# Patient Record
Sex: FEMALE | Race: BLACK OR AFRICAN AMERICAN | NOT HISPANIC OR LATINO | Employment: OTHER | URBAN - METROPOLITAN AREA
[De-identification: names, ages, dates, MRNs, and addresses within clinical notes are randomized per-mention and may not be internally consistent; named-entity substitution may affect disease eponyms.]

---

## 2017-01-05 ENCOUNTER — ALLSCRIPTS OFFICE VISIT (OUTPATIENT)
Dept: OTHER | Facility: OTHER | Age: 67
End: 2017-01-05

## 2017-01-05 ENCOUNTER — LAB REQUISITION (OUTPATIENT)
Dept: LAB | Facility: HOSPITAL | Age: 67
End: 2017-01-05
Payer: COMMERCIAL

## 2017-01-05 DIAGNOSIS — R39.9 UNSPECIFIED SYMPTOMS AND SIGNS INVOLVING THE GENITOURINARY SYSTEM: ICD-10-CM

## 2017-01-05 LAB
BILIRUB UR QL STRIP: NORMAL
CLARITY UR: NORMAL
COLOR UR: YELLOW
FLUAV AG SPEC QL IA: NEGATIVE
GLUCOSE (HISTORICAL): NORMAL
HGB UR QL STRIP.AUTO: NORMAL
INFLUENZA B AG (HISTORICAL): NEGATIVE
KETONES UR STRIP-MCNC: NORMAL MG/DL
LEUKOCYTE ESTERASE UR QL STRIP: NORMAL
NITRITE UR QL STRIP: NORMAL
PH UR STRIP.AUTO: 5 [PH]
PROT UR STRIP-MCNC: NORMAL MG/DL
SP GR UR STRIP.AUTO: 1
UROBILINOGEN UR QL STRIP.AUTO: 0.2

## 2017-01-05 PROCEDURE — 87086 URINE CULTURE/COLONY COUNT: CPT | Performed by: PHYSICIAN ASSISTANT

## 2017-01-07 LAB — BACTERIA UR CULT: NORMAL

## 2017-01-09 ENCOUNTER — GENERIC CONVERSION - ENCOUNTER (OUTPATIENT)
Dept: OTHER | Facility: OTHER | Age: 67
End: 2017-01-09

## 2017-02-07 ENCOUNTER — ALLSCRIPTS OFFICE VISIT (OUTPATIENT)
Dept: OTHER | Facility: OTHER | Age: 67
End: 2017-02-07

## 2017-02-07 DIAGNOSIS — Z12.39 ENCOUNTER FOR OTHER SCREENING FOR MALIGNANT NEOPLASM OF BREAST: ICD-10-CM

## 2017-02-07 LAB — HBA1C MFR BLD HPLC: 8.6 %

## 2017-02-16 ENCOUNTER — GENERIC CONVERSION - ENCOUNTER (OUTPATIENT)
Dept: OTHER | Facility: OTHER | Age: 67
End: 2017-02-16

## 2017-03-17 ENCOUNTER — GENERIC CONVERSION - ENCOUNTER (OUTPATIENT)
Dept: OTHER | Facility: OTHER | Age: 67
End: 2017-03-17

## 2017-04-18 ENCOUNTER — GENERIC CONVERSION - ENCOUNTER (OUTPATIENT)
Dept: OTHER | Facility: OTHER | Age: 67
End: 2017-04-18

## 2018-01-10 NOTE — MISCELLANEOUS
Message  I called and spoke with David Mccarthy to schedule a f/u appt  She stated that she is now following with [facility] due to insurance changes within the past year  No follow up scheduled at this time  Active Problems    1  Abnormal chest CT (793 2) (R93 8)   2  Accelerated hypertension (401 0) (I10)   3  Adrenal adenoma, left (227 0) (D35 02)   4  Asthma (493 90) (J45 909)   5  Benign essential hypertension (401 1) (I10)   6  Carotid stenosis (433 10) (I65 29)   7  Cerebrovascular disease (437 9) (I67 9)   8  Chronic sinusitis (473 9) (J32 9)   9  CKD (chronic kidney disease), stage III (585 3) (N18 3)   10  Diabetic neuropathy (250 60,357 2) (E11 40)   11  Gastroesophageal reflux disease (530 81) (K21 9)   12  Hair loss (704 00) (L65 9)   13  Hyperlipidemia (272 4) (E78 5)   14  Inadequately controlled diabetes mellitus (250 00) (E11 65)   15  Left ventricular hypertrophy (429 3) (I51 7)   16  Medicare annual wellness visit, initial (V70 0) (Z00 00)   17  Medicare annual wellness visit, subsequent (V70 0) (Z00 00)   18  Need for pneumococcal vaccine (V03 82) (Z23)   19  Need for revaccination (V05 9) (Z23)   20  Obstructive sleep apnea (327 23) (G47 33)   21  Primary localized osteoarthritis of hip (715 15) (M16 10)   22  Proteinuria (791 0) (R80 9)   23  Right knee DJD (715 96) (M17 11)   24  Screening breast examination (V76 10) (Z12 39)   25  Trigger ring finger of right hand (727 03) (M65 341)    Current Meds   1  Advair Diskus 250-50 MCG/DOSE Inhalation Aerosol Powder Breath Activated; Inhale 1   puff twice daily; Therapy: 89BFZ1357 to (Evaluate:85Tdw4150)  Requested for: 53Tax4615; Last   Rx:44Bsf9629 Ordered   2  Albuterol Sulfate (2 5 MG/3ML) 0 083% Inhalation Nebulization Solution; USE 1 UNIT   DOSE EVERY 4-6 HOURS AS NEEDED FOR WHEEZING ; Therapy: 94XIC5053 to (Evaluate:88Szy7204)  Requested for: 70OUL9003; Last   Rx:72Nib3986 Ordered   3   AmLODIPine Besylate 10 MG Oral Tablet; TAKE 1 TABLET DAILY AS DIRECTED; Therapy: 05LJN7731 to (Evaluate:10Aug2015); Last Rx:11Feb2015 Ordered   4  Atorvastatin Calcium 40 MG Oral Tablet; TAKE 1 TABLET DAILY AS DIRECTED; Therapy: 27XIC9641 to (Evaluate:17Nov2017)  Requested for: 22Nov2016; Last   Rx:22Nov2016 Ordered   5  Clopidogrel Bisulfate 75 MG Oral Tablet (Plavix); TAKE 1 TABLET DAILY; Therapy: 53WMA4564 to (Evaluate:08Jan2017)  Requested for: 12Jul2016; Last   Rx:12Jul2016 Ordered   6  Doxazosin Mesylate 4 MG Oral Tablet; TAKE 1/2 TABLET DAILY in PM x 1 week then 1 at   night; Therapy: 35HQI4285 to (Evaluate:06Aug2017)  Requested for: 80WOM0047; Last   Rx:07Feb2017 Ordered   7  Fluticasone Propionate 50 MCG/ACT Nasal Suspension; USE 2 SPRAYS IN EACH   NOSTRIL ONCE DAILY; Therapy: 45YWH8201 to (Oneda [place])  Requested for: 36MDP6822; Last   Rx:03Feb2017 Ordered   8  HydroCHLOROthiazide 50 MG Oral Tablet; TAKE 1 TABLET BY MOUTH EVERY OTHER   DAY; Therapy: 89KDE6775 to (Evaluate:08Jan2017)  Requested for: 23Khz4531; Last   Rx:12Jul2016 Ordered   9  Januvia 100 MG Oral Tablet; TAKE 1 TABLET ONCE DAILY; Therapy: 09CTN3047 to (Evaluate:15Jul2017)  Requested for: 37FHK1177; Last   Rx:16Jan2017 Ordered   10  Lantus SoloStar 100 UNIT/ML Subcutaneous Solution Pen-injector; INJECT 52 UNIT    Daily; Therapy: 81VNM7358 to (Evaluate:01Jun2017)  Requested for: 17CDP7345; Last    Rx:06Jun2016 Ordered   11  Losartan Potassium 100 MG Oral Tablet; TAKE 1 TABLET DAILY; Therapy: 96HOH6361 to (Evaluate:17Nov2017)  Requested for: 22Nov2016; Last    Rx:22Nov2016 Ordered   12  Magnesium Oxide 400 MG Oral Tablet; TAKE 1 TABLET DAILY; Therapy: 62YGU2501 to (Evaluate:10Aug2015); Last Rx:60Rul5324 Ordered   13  MetFORMIN HCl - 1000 MG Oral Tablet (Glucophage); TAKE 1 TABLET TWICE DAILY AS    DIRECTED; Therapy: 17JIN6806 to (Evaluate:01Jun2017)  Requested for: 76CKU0955; Last    Rx:06Jun2016 Ordered   14   Metoprolol Succinate  MG Oral Tablet Extended Release 24 Hour (Toprol XL);    TAKE 1 5 TABLET Daily; Therapy: 83PZH1216 to (Evaluate:01Jun2017)  Requested for: 36HAU0363; Last    Rx:06Jun2016 Ordered   15  Pantoprazole Sodium 20 MG Oral Tablet Delayed Release; TAKE 1 TABLET DAILY; Therapy: 59GQO0348 to (Keshav Dave)  Requested for: 51OPI2587; Last    Rx:22Twc1267 Ordered   16  ProAir  (90 Base) MCG/ACT Inhalation Aerosol Solution; INHALE 1 TO 2 PUFFS    EVERY 4 TO 6 HOURS AS NEEDED; Therapy: 03ZXZ4357 to (Evaluate:07Jan2017)  Requested for: 53ODN3566; Last    Rx:05Jan2017 Ordered   17  TraMADol HCl - 50 MG Oral Tablet; TAKE 1 TABLET ONCE OR TWICE DAILY AS    NEEDED FOR PAIN -try to avoid; Therapy: 98LKI0857 to (Evaluate:83Ypu9316); Last Rx:05Jan2017 Ordered    Allergies    1  Lisinopril TABS   2  Sulfa Drugs   3  Victoza SOPN   4   Zithromax TABS    Signatures   Electronically signed by : Isaac Garcia, ; Apr 18 2017 11:51AM EST                       (Author)

## 2018-01-10 NOTE — MISCELLANEOUS
Message   Recorded as Task   Date: 06/07/2016 10:32 AM, Created By: Tiara Tafoya   Task Name: Follow Up   Assigned To: Lila Burroughs   Regarding Patient: Vianey Mercer, Status: In Progress   Comment:    Abraham Osorio - 07 Jun 2016 10:32 AM     TASK CREATED  Still waiting on renin and aldosterone, metanphrines on her  But her electrolytes an renal function are stable  Please relay that to her  Thank you  Helena Duron - 07 Jun 2016 10:52 AM     TASK IN PROGRESS   Helena Duron - 07 Jun 2016 10:52 AM     TASK EDITED  l/m for patient to return call       Patient is aware of above  AG      Active Problems    1  Abnormal chest CT (793 2) (R93 8)   2  Accelerated essential hypertension (401 0) (I10)   3  Accelerated hypertension (401 0) (I10)   4  Asthma (493 90) (J45 909)   5  Benign essential hypertension (401 1) (I10)   6  Carotid stenosis (433 10) (I65 29)   7  Cerebrovascular disease (437 9) (I67 9)   8  Chronic cough (786 2) (R05)   9  Chronic rhinitis (472 0) (J31 0)   10  CKD (chronic kidney disease), stage III (585 3) (N18 3)   11  Cough (786 2) (R05)   12  Gastroesophageal reflux disease (530 81) (K21 9)   13  Hip arthritis (716 95) (M19 90)   14  Hyperlipidemia (272 4) (E78 5)   15  Inadequately controlled diabetes mellitus (250 00) (E11 65)   16  Left ventricular hypertrophy (429 3) (I51 7)   17  Lumbar strain (847 2) (S39 012A)   18  Need for pneumococcal vaccine (V03 82) (Z23)   19  Obstructive sleep apnea (327 23) (G47 33)   20  Primary localized osteoarthritis of hip (715 15) (M16 10)   21  Proteinuria (791 0) (R80 9)   22  Right knee DJD (715 96) (M17 9)   23  Sebaceous cyst (706 2) (L72 3)   24  Shortness of breath (786 05) (R06 02)   25  Trigger ring finger of right hand (727 03) (M65 341)    Current Meds   1  Advair Diskus 250-50 MCG/DOSE Inhalation Aerosol Powder Breath Activated; Inhale 1   puff twice daily; Therapy: 83NTG4640 to (Evaluate:95Tkq2003)  Requested for: 44RWG9247;  Last Rx:74Ckb1861 Ordered   2  Albuterol Sulfate (2 5 MG/3ML) 0 083% Inhalation Nebulization Solution; USE 1 UNIT   DOSE EVERY 4-6 HOURS AS NEEDED FOR WHEEZING ; Therapy: 29WOD0750 to (Evaluate:30Jan2016)  Requested for: 66FKM2182; Last   Rx:54Ihm9913 Ordered   3  AmLODIPine Besylate 10 MG Oral Tablet; TAKE 1 TABLET DAILY AS DIRECTED; Therapy: 84ZBW0434 to (Evaluate:10Aug2015); Last Rx:89Bvm4130 Ordered   4  Atorvastatin Calcium 40 MG Oral Tablet; take one tablet by mouth every day; Therapy: 00OTG1303 to ()  Requested for: 75GWI1721; Last   Rx:30Nov2015 Ordered   5  Clopidogrel Bisulfate 75 MG Oral Tablet; TAKE 1 TABLET DAILY; Therapy: 05JOS0728 to (Evaluate:03Dec2016)  Requested for: 28NNE9450; Last   Rx:06Jun2016 Ordered   6  Doxazosin Mesylate 1 MG Oral Tablet; TAKE 1 TABLET AT BEDTIME; Therapy: 96UJI7881 to (Evaluate:02Yra8053)  Requested for: 02Jun2016; Last   Rx:02Jun2016 Ordered   7  Fluticasone Propionate 50 MCG/ACT Nasal Suspension; USE 2 SPRAYS IN EACH   NOSTRIL ONCE DAILY; Therapy: 90CSP3136 to (Evaluate:28Jun2016)  Requested for: 27TCM7577; Last   Rx:31Phj9024 Ordered   8  Hydrochlorothiazide 50 MG Oral Tablet; TAKE 1 TABLET BY MOUTH EVERY OTHER   DAY; Therapy: 07CKD6489 to (Evaluate:30Mar2016)  Requested for: 02LOX7923; Last   Rx:02Oct2015 Ordered   9  Januvia 100 MG Oral Tablet; TAKE 1 TABLET ONCE DAILY; Therapy: 27JJY1091 to (Evaluate:27Nee4780)  Requested for: 65NBM0481; Last   Rx:02Mar2016 Ordered   10  Lantus SoloStar 100 UNIT/ML Subcutaneous Solution Pen-injector; INJECT 52 UNIT    Daily; Therapy: 48VWV1319 to (Evaluate:01Jun2017)  Requested for: 80PQG7112; Last    Rx:06Jun2016 Ordered   11  Losartan Potassium 100 MG Oral Tablet; TAKE 1 TABLET DAILY; Therapy: 67MCK4194 to (Evaluate:10Zxv1149)  Requested for: 60CWB2004; Last    Rx:22Piz0970 Ordered   12  Magnesium Oxide 400 MG Oral Tablet; TAKE 1 TABLET DAILY; Therapy: 50MQU4507 to (Evaluate:10Aug2015);  Last Rx:29Qra2567 Ordered   13  MetFORMIN HCl - 1000 MG Oral Tablet; TAKE 1 TABLET TWICE DAILY AS DIRECTED; Therapy: 17JZM2354 to (Evaluate:01Jun2017)  Requested for: 15EMB2154; Last    Rx:06Jun2016 Ordered   14  Metoprolol Succinate  MG Oral Tablet Extended Release 24 Hour; TAKE 1 5    TABLET Daily; Therapy: 55QUE4277 to (Evaluate:01Jun2017)  Requested for: 21RRZ2059; Last    Rx:06Jun2016 Ordered   15  Pantoprazole Sodium 20 MG Oral Tablet Delayed Release; TAKE 1 TABLET DAILY; Therapy: 22BAJ6946 to (Jenniffer Goodpasture)  Requested for: 09WPF4863; Last    Rx:63Rwr6126 Ordered   16  ProAir  (90 Base) MCG/ACT Inhalation Aerosol Solution; INHALE 1 TO 2 PUFFS    EVERY 4 TO 6 HOURS AS NEEDED; Therapy: 12SIP6402 to (Evaluate:14Jun2015)  Requested for: 12Jun2015; Last    Rx:12Jun2015 Ordered   17  TraMADol HCl - 50 MG Oral Tablet; TAKE 1 TABLET ONCE OR TWICE DAILY AS    NEEDED FOR PAIN -try to avoid; Therapy: 46RJO9721 to (Evaluate:23Jun2016); Last CO:26TFU7479 Ordered    Allergies    1  Lisinopril TABS   2  Sulfa Drugs   3  Victoza SOPN   4   Zithromax TABS    Signatures   Electronically signed by : Katlin Loo DO; Jun 7 2016  1:15PM EST                       (Author)

## 2018-01-12 VITALS
HEART RATE: 80 BPM | DIASTOLIC BLOOD PRESSURE: 104 MMHG | SYSTOLIC BLOOD PRESSURE: 174 MMHG | HEIGHT: 66 IN | BODY MASS INDEX: 38.93 KG/M2 | WEIGHT: 242.25 LBS

## 2018-01-13 VITALS
SYSTOLIC BLOOD PRESSURE: 182 MMHG | TEMPERATURE: 99.1 F | DIASTOLIC BLOOD PRESSURE: 110 MMHG | HEART RATE: 88 BPM | HEIGHT: 66 IN | BODY MASS INDEX: 38.81 KG/M2 | WEIGHT: 241.5 LBS

## 2018-01-13 NOTE — RESULT NOTES
Verified Results  (1) COMPREHENSIVE METABOLIC PANEL 64VET2180 27:78JH Javier Louis Order Number: [id number]      National Kidney Disease Education Program recommendations are as follows:  GFR calculation is accurate only with a steady state creatinine  Chronic Kidney disease less than 60 ml/min/1 73 sq  meters  Kidney failure less than 15 ml/min/1 73 sq  meters  Test Name Result Flag Reference   GLUCOSE,RANDM 143 mg/dL H    If the patient is fasting, the ADA then defines impaired fasting glucose as > 100 mg/dL and diabetes as > or equal to 123 mg/dL     SODIUM 139 mmol/L  136-145   POTASSIUM 3 7 mmol/L  3 5-5 3   CHLORIDE 103 mmol/L  100-108   CARBON DIOXIDE 30 mmol/L  21-32   ANION GAP (CALC) 6 mmol/L  4-13   BLOOD UREA NITROGEN 18 mg/dL  5-25   CREATININE 1 29 mg/dL  0 60-1 30   Standardized to IDMS reference method   CALCIUM 9 0 mg/dL  8 3-10 1   BILI, TOTAL 0 39 mg/dL  0 20-1 00   ALK PHOSPHATAS 71 U/L     ALT (SGPT) 13 U/L  12-78   AST(SGOT) 15 U/L  5-45   ALBUMIN 3 3 g/dL L 3 5-5 0   TOTAL PROTEIN 7 3 g/dL  6 4-8 2   eGFR Non-African American 50 4 ml/min/1 73sq m       (1) URINALYSIS WITH MICROSCOPIC 02Mar2016 09:34AM Javier Louis Order Number: [id number]     Test Name Result Flag Reference   COLOR Yellow     CLARITY Clear     PH UA 5 5  4 5-8 0   LEUKOCYTE ESTERASE UA Trace A Negative   NITRITE UA Negative  Negative   PROTEIN UA 30 (1+) mg/dl A Negative   GLUCOSE UA Negative mg/dl  Negative   KETONES UA Negative mg/dl  Negative   UROBILINOGEN UA 0 2 E U /dl  0 2, 1 0 E U /dl   BILIRUBIN UA Negative  Negative   BLOOD UA Trace A Negative, Trace-Intact   SPECIFIC GRAVITY UA 1 025  1 003-1 030   WBC UA 2-4 /hpf A None Seen   RBC UA None Seen /hpf  None Seen   HYALINE CASTS 0-1 /lpf A (none)   BACTERIA None Seen /hpf  None Seen, Occasional   AMORPHOUS URATES Occasional /hpf     EPITHELIAL CELLS Occasional /hpf  None Seen, Occasional     (1) LIPID PANEL, FASTING 22UQN8667 09:34AM Myrna Mathews Order Number: [id number]      Triglyceride:         Normal              <150 mg/dl       Borderline High    150-199 mg/dl       High               200-499 mg/dl       Very High          >499 mg/dl  Cholesterol:         Desirable        <200 mg/dl      Borderline High  200-239 mg/dl      High             >239 mg/dl  HDL Cholesterol:        High    >59 mg/dL      Low     <41 mg/dL  LDL CALCULATED:    This screening LDL is a calculated result  It does not have the accuracy of the Direct Measured LDL in the monitoring of patients with hyperlipidemia and/or statin therapy  Direct Measure LDL (MLG707) must be ordered separately in these patients       Test Name Result Flag Reference   CHOLESTEROL 228 mg/dL H    HDL,DIRECT 57 mg/dL  40-60   LDL CHOLESTEROL CALCULATED 128 mg/dL H 0-100   TRIGLYCERIDES 215 mg/dL H <=150

## 2018-01-13 NOTE — RESULT NOTES
Verified Results  (1) URINE CULTURE 03XVI8165 04:31PM Lopez Ruth    Order Number: [id number]     Test Name Result Flag Reference   CLINICAL REPORT (Report)     Test:        Urine culture  Specimen Type:   Urine  Specimen Date:   1/5/2017 4:31 PM  Result Date:    1/7/2017 12:51 PM  Result Status:   Final result  Resulting Lab:   [place] [address]            Tel: [phone]      CULTURE                                       ------------------                                   30,000-39,000 cfu/ml Mixed Contaminants X3

## 2018-01-15 NOTE — RESULT NOTES
Verified Results  * CT CHEST WO CONTRAST 43PEX0579 09:31AM [facility]     Test Name Result Flag Reference   CT CHEST WO CONTRAST (Report)     CT CHEST WITHOUT IV CONTRAST     INDICATION: Follow up lymphadenopathy, right-sided chest pain     COMPARISON: 12/4/2015     TECHNIQUE: CT examination of the chest was performed without intravenous contrast  Axial, sagittal and coronal reformatted images were submitted for interpretation  Coronal thick section MIP (maximal intensity projection) images were also created  This examination, like all CT scans performed in the [facility], was performed utilizing techniques to minimize radiation dose exposure, including the use of iterative reconstruction and automated exposure control  FINDINGS:     LUNGS: There is a 2 mm right middle lobe nodule on series 602, image 27  This is stable from the recent prior study  There is no tracheal or endobronchial lesion  PLEURA: Unremarkable  HEART/GREAT VESSELS: Unremarkable for patient's age  MEDIASTINUM AND LUL: There is a precarinal lymph node measuring 1 1 x 1 6 cm, previously 1 4 x 1 5 cm  Subcentimeter para-aortic lymph nodes measure 4 and 8 mm in shortest axis, previously 6 and 9 mm  There is no hilar lymphadenopathy though    evaluation is limited without contrast      CHEST WALL AND LOWER NECK: There is a subcentimeter right thyroid nodule  VISUALIZED STRUCTURES IN THE UPPER ABDOMEN: There is a too small to characterize hypodense lesion in the lateral segment of the left hepatic lobe, stable and likely a small cyst  There is a 1 1 x 1 4 cm left adrenal adenoma  OSSEOUS STRUCTURES: No acute fracture  No destructive osseous lesion  IMPRESSION:     1  Slight interval decrease in size of previously seen lymph nodes, the largest in the precarinal region  No gross hilar adenopathy though limited evaluation due to lack of contrast      2  Subcentimeter right thyroid nodule   ccording to guidelines published in the February 2015 white paper on this topic in the Journal of the [employer] of Radiology [place]), because the nodule(s) are less than 1 5 cm in size and without suspicious   feature, no further evaluation is recommended  3  Stable 2 mm right middle lobe lung nodule  If there are risk factors for lung cancer, an additional follow-up in 9 months is recommended  4  Left adrenal adenoma         Workstation performed: [id number]     Signed by:   Leatha Batista MD   3/2/16

## 2018-03-07 NOTE — PROGRESS NOTES
History of Present Illness    Revaccination   Vaccine Information: Vaccine(s) Given (names): pneumovax 2016  Spoke with patient regarding risks and benefits of revaccination  Action(s): Pt will be revaccinated  Appointment scheduled: [phone] pp  Pt called (attempt 1): [phone] pp   left message with spouse  No Show Appt(s): [id number]  Revaccination Completed: [id number]  Active Problems     1  Carotid stenosis (433 10) (I65 29)   2  Diabetic neuropathy (250 60,357 2) (E11 40)   3  Gastroesophageal reflux disease (530 81) (K21 9)   4  Hair loss (704 00) (L65 9)   5  Hyperlipidemia (272 4) (E78 5)   6  Left ventricular hypertrophy (429 3) (I51 7)   7  Need for pneumococcal vaccine (V03 82) (Z23)   8  Proteinuria (791 0) (R80 9)   9  Trigger ring finger of right hand (727 03) (M65 341)    Cerebrovascular disease (437 9) (I67 9)     - hx "ministrokes"       Asthma (493 90) (J45 909)       Hip arthritis (716 95) (M19 90)       Primary localized osteoarthritis of hip (715 15) (M16 10)       Inadequately controlled diabetes mellitus (250 00) (E11 65)     - onset        Sebaceous cyst (706 2) (L72 3)       Hypertension, essential (401 9) (I10)       Shortness of breath (786 05) (R06 02)       Abnormal chest CT (793 2) (R93 8)       Lumbar strain (847 2) (S39 012A)       Cough (786 2) (R05)       Chronic rhinitis (472 0) (J31 0)       Chronic cough (786 2) (R05)       CKD (chronic kidney disease), stage III (585 3) (N18 3)       Accelerated hypertension (401 0) (I10)       Chronic sinusitis (473 9) (J32 9)          Immunizations  Influenza --- Nikolay Boyle: [id number] Ashin-Oct-2016   PPSV --- Series1: 2016     Current Meds   1  Advair Diskus 250-50 MCG/DOSE Inhalation Aerosol Powder Breath Activated; Inhale 1   puff twice daily   2  Albuterol Sulfate (2 5 MG/3ML) 0 083% Inhalation Nebulization Solution; USE 1 UNIT   DOSE EVERY 4-6 HOURS AS NEEDED FOR WHEEZING    3   AmLODIPine Besylate 10 MG Oral Tablet; TAKE 1 TABLET DAILY AS DIRECTED   4  Atorvastatin Calcium 40 MG Oral Tablet; TAKE 1 TABLET DAILY AS DIRECTED   5  Clopidogrel Bisulfate 75 MG Oral Tablet; TAKE 1 TABLET DAILY   6  Doxazosin Mesylate 1 MG Oral Tablet; TAKE 1 TABLET AT BEDTIME   7  Fluticasone Propionate 50 MCG/ACT Nasal Suspension; USE 2 SPRAYS IN EACH   NOSTRIL ONCE DAILY   8  HydroCHLOROthiazide 50 MG Oral Tablet; TAKE 1 TABLET BY MOUTH EVERY OTHER   DAY   9  Januvia 100 MG Oral Tablet; TAKE 1 TABLET ONCE DAILY   10  Lantus SoloStar 100 UNIT/ML Subcutaneous Solution Pen-injector; INJECT 52 UNIT    Daily   11  Losartan Potassium 100 MG Oral Tablet; TAKE 1 TABLET DAILY   12  Magnesium Oxide 400 MG Oral Tablet; TAKE 1 TABLET DAILY   13  MetFORMIN HCl - 1000 MG Oral Tablet; TAKE 1 TABLET TWICE DAILY AS DIRECTED   14  Metoprolol Succinate  MG Oral Tablet Extended Release 24 Hour; TAKE 1 5    TABLET Daily   15  Pantoprazole Sodium 20 MG Oral Tablet Delayed Release; TAKE 1 TABLET DAILY   16  ProAir  (90 Base) MCG/ACT Inhalation Aerosol Solution; INHALE 1 TO 2 PUFFS    EVERY 4 TO 6 HOURS AS NEEDED   17  TraMADol HCl - 50 MG Oral Tablet; TAKE 1 TABLET ONCE OR TWICE DAILY AS NEEDED    FOR PAIN -try to avoid    Allergies    1  Lisinopril TABS   2  Sulfa Drugs   3  Victoza SOPN   4  Zithromax TABS    Plan    1   RVAC-Pneumovax 23 25 MCG/0 5ML Injection Injectable    Signatures   Electronically signed by : Sayda Ardon MD; Feb 7 2017  9:02PM EST                       (Author)

## 2022-01-17 ENCOUNTER — TELEPHONE (OUTPATIENT)
Dept: ADMINISTRATIVE | Facility: OTHER | Age: 72
End: 2022-01-17

## 2022-01-17 NOTE — TELEPHONE ENCOUNTER
----- Message from Carlee Keane sent at 1/17/2022  8:26 AM EST -----  Regarding: Mammogram  01/17/22 8:26 AM    Hello, our patient Sharifer Mason has had Mammogram completed/performed  Please assist in updating the patient chart by pulling the Care Everywhere (CE) document  The date of service is 1/14/2022       Thank you,  Carlee Keane  [facility] [address]

## 2022-01-21 NOTE — TELEPHONE ENCOUNTER
Upon review of the In Basket request we were able to locate, review, and update the patient chart as requested for Mammogram I was able to complete my workflow but items are not showing up in HM  Please contact the email listed below so that they may look into why HM items are not showing up  Any additional questions or concerns should be emailed to the Practice Liaisons via [email or web address] email, please do not reply via In Basket      Thank you  Audrey Badillo